# Patient Record
Sex: FEMALE | Race: BLACK OR AFRICAN AMERICAN | Employment: FULL TIME | URBAN - METROPOLITAN AREA
[De-identification: names, ages, dates, MRNs, and addresses within clinical notes are randomized per-mention and may not be internally consistent; named-entity substitution may affect disease eponyms.]

---
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
## 2021-08-16 ENCOUNTER — HOSPITAL ENCOUNTER (EMERGENCY)
Age: 21
Discharge: LEFT BEFORE DISCHARGE | End: 2021-08-16
Payer: OTHER GOVERNMENT

## 2021-08-16 VITALS
BODY MASS INDEX: 27.19 KG/M2 | RESPIRATION RATE: 16 BRPM | DIASTOLIC BLOOD PRESSURE: 66 MMHG | TEMPERATURE: 97.3 F | HEART RATE: 85 BPM | SYSTOLIC BLOOD PRESSURE: 103 MMHG | HEIGHT: 61 IN | WEIGHT: 144 LBS

## 2021-08-16 PROCEDURE — 75810000275 HC EMERGENCY DEPT VISIT NO LEVEL OF CARE

## 2022-05-26 ENCOUNTER — HOSPITAL ENCOUNTER (EMERGENCY)
Dept: ULTRASOUND IMAGING | Age: 22
Discharge: HOME | End: 2022-05-26
Attending: STUDENT IN AN ORGANIZED HEALTH CARE EDUCATION/TRAINING PROGRAM
Payer: COMMERCIAL

## 2022-05-26 ENCOUNTER — APPOINTMENT (OUTPATIENT)
Dept: GENERAL RADIOLOGY | Age: 22
End: 2022-05-26
Attending: STUDENT IN AN ORGANIZED HEALTH CARE EDUCATION/TRAINING PROGRAM
Payer: COMMERCIAL

## 2022-05-26 ENCOUNTER — HOSPITAL ENCOUNTER (EMERGENCY)
Age: 22
Discharge: HOME | End: 2022-05-26
Attending: STUDENT IN AN ORGANIZED HEALTH CARE EDUCATION/TRAINING PROGRAM
Payer: COMMERCIAL

## 2022-05-26 VITALS
HEART RATE: 133 BPM | OXYGEN SATURATION: 99 % | BODY MASS INDEX: 31.53 KG/M2 | TEMPERATURE: 98.4 F | HEIGHT: 61 IN | SYSTOLIC BLOOD PRESSURE: 129 MMHG | DIASTOLIC BLOOD PRESSURE: 98 MMHG | WEIGHT: 167 LBS | RESPIRATION RATE: 34 BRPM

## 2022-05-26 DIAGNOSIS — R07.81 RIB PAIN ON RIGHT SIDE: Primary | ICD-10-CM

## 2022-05-26 DIAGNOSIS — K59.00 CONSTIPATION, UNSPECIFIED CONSTIPATION TYPE: ICD-10-CM

## 2022-05-26 DIAGNOSIS — R10.11 ABDOMINAL PAIN, RIGHT UPPER QUADRANT: ICD-10-CM

## 2022-05-26 DIAGNOSIS — Z87.19 HISTORY OF ULCERATIVE COLITIS: ICD-10-CM

## 2022-05-26 LAB
ALBUMIN SERPL-MCNC: 3.7 G/DL (ref 3.4–5)
ALBUMIN/GLOB SERPL: 0.8 {RATIO} (ref 0.8–1.7)
ALP SERPL-CCNC: 66 U/L (ref 45–117)
ALT SERPL-CCNC: 19 U/L (ref 13–56)
ANION GAP SERPL CALC-SCNC: 4 MMOL/L (ref 3–18)
APPEARANCE UR: CLEAR
AST SERPL-CCNC: 16 U/L (ref 10–38)
BASOPHILS # BLD: 0 K/UL (ref 0–0.1)
BASOPHILS NFR BLD: 0 % (ref 0–2)
BILIRUB SERPL-MCNC: 0.2 MG/DL (ref 0.2–1)
BILIRUB UR QL: NEGATIVE
BUN SERPL-MCNC: 11 MG/DL (ref 7–18)
BUN/CREAT SERPL: 15 (ref 12–20)
CALCIUM SERPL-MCNC: 9 MG/DL (ref 8.5–10.1)
CHLORIDE SERPL-SCNC: 105 MMOL/L (ref 100–111)
CO2 SERPL-SCNC: 29 MMOL/L (ref 21–32)
COLOR UR: YELLOW
CREAT SERPL-MCNC: 0.72 MG/DL (ref 0.6–1.3)
DIFFERENTIAL METHOD BLD: ABNORMAL
EOSINOPHIL # BLD: 0.1 K/UL (ref 0–0.4)
EOSINOPHIL NFR BLD: 1 % (ref 0–5)
ERYTHROCYTE [DISTWIDTH] IN BLOOD BY AUTOMATED COUNT: 12.1 % (ref 11.6–14.5)
GLOBULIN SER CALC-MCNC: 4.9 G/DL (ref 2–4)
GLUCOSE SERPL-MCNC: 96 MG/DL (ref 74–99)
GLUCOSE UR STRIP.AUTO-MCNC: NEGATIVE MG/DL
HCG SERPL-ACNC: 33 MIU/ML (ref 0–10)
HCG UR QL: POSITIVE
HCT VFR BLD AUTO: 40.6 % (ref 35–45)
HGB BLD-MCNC: 12.5 G/DL (ref 12–16)
HGB UR QL STRIP: NEGATIVE
IMM GRANULOCYTES # BLD AUTO: 0.1 K/UL (ref 0–0.04)
IMM GRANULOCYTES NFR BLD AUTO: 1 % (ref 0–0.5)
KETONES UR QL STRIP.AUTO: NEGATIVE MG/DL
LEUKOCYTE ESTERASE UR QL STRIP.AUTO: NEGATIVE
LIPASE SERPL-CCNC: 96 U/L (ref 73–393)
LYMPHOCYTES # BLD: 2.5 K/UL (ref 0.9–3.6)
LYMPHOCYTES NFR BLD: 23 % (ref 21–52)
MCH RBC QN AUTO: 26.4 PG (ref 24–34)
MCHC RBC AUTO-ENTMCNC: 30.8 G/DL (ref 31–37)
MCV RBC AUTO: 85.8 FL (ref 78–100)
MONOCYTES # BLD: 1.1 K/UL (ref 0.05–1.2)
MONOCYTES NFR BLD: 10 % (ref 3–10)
NEUTS SEG # BLD: 7.2 K/UL (ref 1.8–8)
NEUTS SEG NFR BLD: 65 % (ref 40–73)
NITRITE UR QL STRIP.AUTO: NEGATIVE
NRBC # BLD: 0 K/UL (ref 0–0.01)
NRBC BLD-RTO: 0 PER 100 WBC
PH UR STRIP: 7 [PH] (ref 5–8)
PLATELET # BLD AUTO: 339 K/UL (ref 135–420)
PMV BLD AUTO: 9.3 FL (ref 9.2–11.8)
POTASSIUM SERPL-SCNC: 3.8 MMOL/L (ref 3.5–5.5)
PROT SERPL-MCNC: 8.6 G/DL (ref 6.4–8.2)
PROT UR STRIP-MCNC: NEGATIVE MG/DL
RBC # BLD AUTO: 4.73 M/UL (ref 4.2–5.3)
SODIUM SERPL-SCNC: 138 MMOL/L (ref 136–145)
SP GR UR REFRACTOMETRY: 1.02 (ref 1–1.03)
UROBILINOGEN UR QL STRIP.AUTO: 0.2 EU/DL (ref 0.2–1)
WBC # BLD AUTO: 11 K/UL (ref 4.6–13.2)

## 2022-05-26 PROCEDURE — 81003 URINALYSIS AUTO W/O SCOPE: CPT

## 2022-05-26 PROCEDURE — 74011000250 HC RX REV CODE- 250: Performed by: STUDENT IN AN ORGANIZED HEALTH CARE EDUCATION/TRAINING PROGRAM

## 2022-05-26 PROCEDURE — 74022 RADEX COMPL AQT ABD SERIES: CPT

## 2022-05-26 PROCEDURE — 76705 ECHO EXAM OF ABDOMEN: CPT

## 2022-05-26 PROCEDURE — C9113 INJ PANTOPRAZOLE SODIUM, VIA: HCPCS | Performed by: STUDENT IN AN ORGANIZED HEALTH CARE EDUCATION/TRAINING PROGRAM

## 2022-05-26 PROCEDURE — 96375 TX/PRO/DX INJ NEW DRUG ADDON: CPT

## 2022-05-26 PROCEDURE — 80053 COMPREHEN METABOLIC PANEL: CPT

## 2022-05-26 PROCEDURE — 74011250637 HC RX REV CODE- 250/637: Performed by: STUDENT IN AN ORGANIZED HEALTH CARE EDUCATION/TRAINING PROGRAM

## 2022-05-26 PROCEDURE — 99284 EMERGENCY DEPT VISIT MOD MDM: CPT

## 2022-05-26 PROCEDURE — 81025 URINE PREGNANCY TEST: CPT

## 2022-05-26 PROCEDURE — 84702 CHORIONIC GONADOTROPIN TEST: CPT

## 2022-05-26 PROCEDURE — 83690 ASSAY OF LIPASE: CPT

## 2022-05-26 PROCEDURE — 74011250636 HC RX REV CODE- 250/636: Performed by: STUDENT IN AN ORGANIZED HEALTH CARE EDUCATION/TRAINING PROGRAM

## 2022-05-26 PROCEDURE — 85025 COMPLETE CBC W/AUTO DIFF WBC: CPT

## 2022-05-26 PROCEDURE — 96374 THER/PROPH/DIAG INJ IV PUSH: CPT

## 2022-05-26 RX ORDER — KETOROLAC TROMETHAMINE 15 MG/ML
15 INJECTION, SOLUTION INTRAMUSCULAR; INTRAVENOUS ONCE
Status: COMPLETED | OUTPATIENT
Start: 2022-05-26 | End: 2022-05-26

## 2022-05-26 RX ORDER — SUCRALFATE 1 G/1
1 TABLET ORAL ONCE
Status: COMPLETED | OUTPATIENT
Start: 2022-05-26 | End: 2022-05-26

## 2022-05-26 RX ORDER — KETOROLAC TROMETHAMINE 10 MG/1
10 TABLET, FILM COATED ORAL
Qty: 20 TABLET | Refills: 0 | Status: SHIPPED | OUTPATIENT
Start: 2022-05-26 | End: 2022-06-02

## 2022-05-26 RX ORDER — LIDOCAINE 4 G/100G
1 PATCH TOPICAL EVERY 24 HOURS
Status: DISCONTINUED | OUTPATIENT
Start: 2022-05-26 | End: 2022-05-26 | Stop reason: HOSPADM

## 2022-05-26 RX ORDER — FAMOTIDINE 10 MG/ML
20 INJECTION INTRAVENOUS
Status: COMPLETED | OUTPATIENT
Start: 2022-05-26 | End: 2022-05-26

## 2022-05-26 RX ORDER — PANTOPRAZOLE SODIUM 40 MG/10ML
40 INJECTION, POWDER, LYOPHILIZED, FOR SOLUTION INTRAVENOUS
Status: COMPLETED | OUTPATIENT
Start: 2022-05-26 | End: 2022-05-26

## 2022-05-26 RX ADMIN — FAMOTIDINE 20 MG: 10 INJECTION INTRAVENOUS at 18:55

## 2022-05-26 RX ADMIN — SODIUM CHLORIDE, POTASSIUM CHLORIDE, SODIUM LACTATE AND CALCIUM CHLORIDE 1000 ML: 600; 310; 30; 20 INJECTION, SOLUTION INTRAVENOUS at 17:55

## 2022-05-26 RX ADMIN — KETOROLAC TROMETHAMINE 15 MG: 15 INJECTION, SOLUTION INTRAMUSCULAR; INTRAVENOUS at 17:55

## 2022-05-26 RX ADMIN — SUCRALFATE 1 G: 1 TABLET ORAL at 18:56

## 2022-05-26 RX ADMIN — PANTOPRAZOLE SODIUM 40 MG: 40 INJECTION, POWDER, FOR SOLUTION INTRAVENOUS at 18:55

## 2022-05-26 NOTE — DISCHARGE INSTRUCTIONS
Please carefully read all discharge instructions    Please follow-up with a primary care physician and if you do not have one currently use the contact information provided to obtain an appointment. If none was provided please call the number on the back of your insurance card to locate a Primary care doctor. Many offices have \"cancellation lists\" that you can ask to be placed on; should a patient with an earlier appointment cancel you will be notified to take their place. Please return to the Emergency Room immediately if your symptoms worsen. Please return to the Emergency Department if you develop a fever, chills, cannot eat or drink due to nausea or vomiting, or if any of your symptoms worsen. If you do not have insurance you can use the below for your medications. In[employer]ion. com    What are GoodRx coupons? GoodRx coupons will help you pay less than the cash price for your prescription. Selena Mary free to use and are accepted at virtually every [place] pharmacy. Your pharmacist will know how to enter the codes on the coupon to pull up the lowest discount available. SEC Watch Activation    Thank you for requesting access to SEC Watch. Please follow the instructions below to securely access and download your online medical record. SEC Watch allows you to send messages to your doctor, view your test results, renew your prescriptions, schedule appointments, and more. How Do I Sign Up? In your internet browser, go to [email or web address]  Click on the First Time User? Click Here link in the Sign In box. You will be redirect to the New Member Sign Up page. Enter your SEC Watch Access Code exactly as it appears below. You will not need to use this code after youve completed the sign-up process. If you do not sign up before the expiration date, you must request a new code.     SEC Watch Access Code: [email or web address]  Expires: 7/10/2022  1:59 PM    Enter the last four digits of your Social Security Number (xxxx) and Date of Birth (mm/dd/yyyy) as indicated and click Submit. You will be taken to the next sign-up page. Create a [employer] ID. This will be your [employer] login ID and cannot be changed, so think of one that is secure and easy to remember. Create a [employer] password. You can change your password at any time. Enter your Password Reset Question and Answer. This can be used at a later time if you forget your password. Enter your e-mail address. You will receive e-mail notification when new information is available in [address] Click Sign Up. You can now view and download portions of your medical record. Click the Dis[employer] link to download a portable copy of your medical information. Additional Information    If you have questions, please visit the Frequently Asked Questions section of the [employer] website at [email or web address] Remember, [employer] is NOT to be used for urgent needs. For medical emergencies, dial 911. Acetaminophen (Tylenol)   Take two 325 mg every 6 hours or two 500 mg tylenol every 8 hours as needed for pain/fever (do not take other tylenol containing products ). The ideal maximum dose is 3 grams per day, which would be 6 extra strength Tylenol (500 mg each). Dont take more than that if possible, therefore it is better to take 650 mg every 6 hours. Ibuprofen  For the treatment of mild to moderate pain, 400 mg of ibuprofen will work but doesn't help for inflammation   You can take 600mg every 6 hours. Generally taken every 6 to 8 hours, the maximum dose of Ibuprofen is 2400 mg per day which is 12 over-the-counter tablets. You can alternate tylenol and ibuprofen every 3 hours so that you are taking something for fever/pain every 3 hours. RICE, Routine Care for Bruises, Sprains and Strains    The routine care of injuries may include any of the following: Rest, Ice, Compression, and Elevation (RICE).     Rest is required to allow your body to heal. Usually following bumps and bruises, everyday activities can be resumed as soon as you are comfortable. Injured tendons (cord like structures that attach muscle to bone) and bones take about 6 weeks to heal.    Ice following an injury helps keep the swelling down. Ice helps reduce pain. Do not apply ice directly to skin. Apply ice bags (ice in a plastic bag wrapped in a towel to prevent frostbite to skin) about every 2 hours for 20 minutes, while awake, to the injured area for the first 24 hours to 48 hours. After the first 24 to 48 hours, use as directed by your caregiver. Compression helps keep swelling down, gives support, and helps with discomfort. If an ace bandage (stretchy, elastic wrapping bandage) has been applied today, it should be removed and reapplied every 3 to 4 hours. It should not be applied tightly. It should be applied firmly enough to keep swelling down. Watch your fingers or toes for swelling, bluish discoloration, coldness, numbness, or excessive pain. If any of these symptoms (problems) occur, remove the ace bandage and reapply more loosely. If these symptoms persist, contact your caregiver or return to this location. Elevation helps reduce swelling, and decreases pain. With extremities (arms/hands and legs/feet), the injured area should be placed near to or above the heart level if patient is able. If a splint, special shoe or cast has been applied (used) today, watch the parts involved for any of the above problems noted in use of compression. HOME CARE INSTRUCTIONS  Persistent pain and inability to use the injured area for more than 2 to 3 days are warning signs indicating  you should see a caregiver for a follow-up visit as soon as possible. Initially, a hairline fracture (the same as a broken bone) may not be seen on x-rays.  Persistent pain and swelling indicate that further evaluation, non-weight bearing (use of crutches as instructed), and/or further x-rays are needed. X-rays may sometimes not show a small fracture until a week or ten days later. Make a follow-up appointment with your own caregiver or one to whom you have been referred. Make a rice pack for heat and aches and pains    Using dry white rice and a tube sock. Fill the sock with rice. Leave enough room at the top so you can close the opening by  tying it with a rubber band or string -- basically anything you think will hold the rice in. Microwave on high for 1-2 minutes. Remove from the microwave (again, be careful, it will be hot). Apply to area that is sore. If you need more time once the heating pad has gone cold, microwave again for 1 minute and reapply. Making your own heating pad is cost-efficient and safer than using an electric heating pad. It also saves you a trip to the store, when youre too sore to leave the house. Schedule an appointment with your doctor if muscle and joint pain persists for several days. High fiber diet with green leafy vegetables and stay well hydrated (drink 8 glasses of 8oz of water per day). Continue the miralax Try to avoid constipating foods (i.e. Cheese). With respect to medications for constipation, take colace twice a day as a stool softener until bowel movements normalize. Often times, taking this once a day when you are normal will help with keeping your BMs regular - and you may continue this long term if desired. Take Miralax twice a day until your next significant bowel movement, then daily until things normalize - at which point this can be cut back. If you do not have progress in 3-5 days with miralax, then take milk of magnesia as well. Do not hesitate to return to the ER if you begin to experience new or worsening symptoms.

## 2022-05-26 NOTE — ED TRIAGE NOTES
Pt arrives to ED with c\o RUQ abd pain that started this AM, pt denies n/v/d, denies urinary sx, pt in obvious distress in triage aeb rapid shallow breaths and guarding

## 2022-05-28 NOTE — ED PROVIDER NOTES
EMERGENCY DEPARTMENT HISTORY AND PHYSICAL EXAM      Date: 5/26/2022  Patient Name: [facility]    History of Presenting Illness     Chief Complaint   Patient presents with    Abdominal Pain       HPI:  [facility] is a 24 y.o. female with a history of RUQ pain and middle abdominal pain since Monday intermittent and worse this am. Denies nausea, vomiting, diarrhea, dysuria. On review of systems, the patient denies trauma, fever, chills, IV drug use, hematuria, dysuria, numbness, weakness, tingling    On humira, no recent UC flare. PCP: Zarina Santiago MD    Current Outpatient Medications   Medication Sig Dispense Refill    ketorolac (TORADOL) 10 mg tablet Take 1 Tablet by mouth every six (6) hours as needed for Pain for up to 7 days. 20 Tablet 0    adalimumab (HUMIRA) 40 mg/0.8 mL injection 40 mg by SubCUTAneous route every seven (7) days.  cholecalciferol (VITAMIN D3) 1,000 unit cap Take 1,000 Units by mouth daily.  MERCAPTOPURINE PO Take  by mouth daily.  ibuprofen (MOTRIN) 600 mg tablet Take 1 Tab by mouth every six (6) hours as needed for Pain. 20 Tab 0    ferrous sulfate 325 mg (65 mg iron) tablet Take  by mouth Daily (before breakfast). Past History     Past Medical History:  Past Medical History:   Diagnosis Date    Low iron     Ulcerative colitis ([address]        Past Surgical History:  No past surgical history on file. Family History:  No family history on file. Social History:  Social History     Tobacco Use    Smoking status: Never Smoker    Smokeless tobacco: Never Used   Substance Use Topics    Alcohol use: No    Drug use: Never       Allergies:  No Known Allergies    PMH, PSH, family history, social history, allergies reviewed with the patient with significant items noted above. Review of Systems   As per HPI, otherwise reviewed and negative.      Physical Exam     Vitals:    05/26/22 1358   BP: (!) 129/98   Pulse: (!) 133   Resp: (!) 34   Temp: 98.4 °F (36.9 °C)   SpO2: 99%   Weight: 75.8 kg (167 lb)   Height: 5' 1\" (1.549 m)       Gen: Appears uncomfortable  HEENT: Normocephalic, sclera anicteric  Cardiovascular: Normal rate, regular rhythm, no murmurs, rubs, gallops. Pulses intact and equal distally. Pulmonary: No respiratory distress. No stridor. Clear lungs. ABD: Soft, nontender, nondistended. Neuro: Full strength and sensation throughout the bilateral lower extremities. Alert. Normal speech. Normal mentation. Psych: Normal thought content and thought processes. : No CVA tenderness. EXT: Normal bulk and tone. Moves all extremities well. Skin: Warm and well-perfused. Back:No Midline tenderness of the lumbar spine. Diagnostic Study Results     Labs -   No results found for this or any previous visit (from the past 12 hour(s)). Radiologic Studies -   US ABD LTD   Final Result      No gallstones or secondary findings of cholecystitis. No acute or focal   abnormality otherwise to explain patient's right upper quadrant pain. XR ABD ACUTE W 1 V CHEST   Final Result      No acute radiographic abnormality demonstrated. CT Results  (Last 48 hours)    None        CXR Results  (Last 48 hours)    None            Medical Decision Making   I am the first provider for this patient. I reviewed the vital signs, available nursing notes, past medical history, past surgical history, family history and social history. Vital Signs-Reviewed the patient's vital signs. Records Reviewed: Personally, on initial evaluation    MDM:   Patient presents with abdominal pain.       Considered Pancreatitis, Cholecystitis, Cholangitis, Hepatitis, Appendicitis, Diverticulitis, Colitis, Gastritis, PUD, Ureterolithiasis, SBO, Gastroparesis, Bowel Perforation, Hernia, Mesenteric Ischemia, volvulus, AAA     Plan:   Pain and symptom Control  Close Observation  As per orders noted below:  Orders Placed This Encounter    XR ABD ACUTE W 1 V CHEST  US ABD LTD    CBC WITH AUTOMATED DIFF    METABOLIC PANEL, COMPREHENSIVE    URINALYSIS W/ RFLX MICROSCOPIC    LIPASE    HCG URINE, QL    BETA HCG, QT    lactated ringers bolus infusion 1,000 mL    ketorolac (TORADOL) injection 15 mg    famotidine (PF) (PEPCID) injection 20 mg    pantoprazole (PROTONIX) injection 40 mg    sucralfate (CARAFATE) tablet 1 g    DISCONTD: lidocaine 4 % patch 1 Patch    ketorolac (TORADOL) 10 mg tablet        ED Course:   ED Course as of 22   Thu May 26, 2022   1450 CBC without leukocytosis or anemia. [DM]   1 Pill  early may. [DM]   1632 Urinalysis w/o signs of infection     [DM]      ED Course User Index  [DM] Merly Valero MD        Shared decision making, patient feeling much better  Does not want CT today. Lab work and PE remains benign, patients pain has resolved and is safe for discharge at this time, VSS, patient is comfortable with plan and will return should symptoms change      The patient is, again, nontoxic - with a reassuring repeat abdominal exam.   Has remained hemodynamically stable throughout the ED course. Diagnostic Study Results     Orders Placed This Encounter    XR ABD ACUTE W 1 V CHEST     Standing Status:   Standing     Number of Occurrences:   1     Order Specific Question:   Transport     Answer:   Stretcher [5]     Order Specific Question:   Reason for Exam     Answer:   Abdominal pain    US ABD LTD     Standing Status:   Standing     Number of Occurrences:   1     Order Specific Question:   Transport     Answer:   BED [2]     Order Specific Question:   Reason for Exam     Answer:   right uq     Order Specific Question:   Is Patient Pregnant?      Answer:   Unknown     Order Specific Question:   Specific Body Part     Answer:   RUQ    CBC WITH AUTOMATED DIFF     Standing Status:   Standing     Number of Occurrences:   1    METABOLIC PANEL, COMPREHENSIVE     Standing Status:   Standing     Number of Occurrences:   1    URINALYSIS W/ RFLX MICROSCOPIC     Standing Status:   Standing     Number of Occurrences:   1    LIPASE     Standing Status:   Standing     Number of Occurrences:   1    HCG URINE, QL     Standing Status:   Standing     Number of Occurrences:   1    BETA HCG, QT     Standing Status:   Standing     Number of Occurrences:   1    lactated ringers bolus infusion 1,000 mL    ketorolac (TORADOL) injection 15 mg    famotidine (PF) (PEPCID) injection 20 mg     Order Specific Question:   H2RA INDICATION     Answer:   Symptomatic GERD    pantoprazole (PROTONIX) injection 40 mg     Order Specific Question:   PPI INDICATION     Answer:   Symptomatic GERD    sucralfate (CARAFATE) tablet 1 g    DISCONTD: lidocaine 4 % patch 1 Patch    ketorolac (TORADOL) 10 mg tablet     Sig: Take 1 Tablet by mouth every six (6) hours as needed for Pain for up to 7 days. Dispense:  20 Tablet     Refill:  0       Labs -   No results found for this or any previous visit (from the past 12 hour(s)). Radiologic Studies -   US ABD LTD   Final Result      No gallstones or secondary findings of cholecystitis. No acute or focal   abnormality otherwise to explain patient's right upper quadrant pain. XR ABD ACUTE W 1 V CHEST   Final Result      No acute radiographic abnormality demonstrated. CT Results  (Last 48 hours)    None        CXR Results  (Last 48 hours)    None          Disposition     Disposition:  Home    CLINICAL IMPRESSION:    1. Rib pain on right side    2. Constipation, unspecified constipation type    3. History of ulcerative colitis    4.  Abdominal pain, right upper quadrant        It should be noted that I will be the provider of record for this patient  Arlette Villegas MD    Follow-up Information     Follow up With Specialties Details Why [address]    THE [facility] EMERGENCY DEPT Emergency Medicine Go to  If symptoms worsen [address] Family Medicine at [facility]  Call  if you do not have a  Halie Caruso  [phone]          Discharge Medication List as of 5/26/2022  6:50 PM          Please note that this dictation was completed with [employer], the computer voice recognition software. Quite often unanticipated grammatical, syntax, homophones, and other interpretive errors are inadvertently transcribed by the computer software. Please disregard these errors. Please excuse any errors that have escaped final proofreading.